# Patient Record
Sex: FEMALE | Race: WHITE | URBAN - METROPOLITAN AREA
[De-identification: names, ages, dates, MRNs, and addresses within clinical notes are randomized per-mention and may not be internally consistent; named-entity substitution may affect disease eponyms.]

---

## 2017-03-06 ENCOUNTER — APPOINTMENT (RX ONLY)
Dept: URBAN - METROPOLITAN AREA CLINIC 39 | Facility: CLINIC | Age: 47
Setting detail: DERMATOLOGY
End: 2017-03-06

## 2017-03-06 DIAGNOSIS — L70.0 ACNE VULGARIS: ICD-10-CM | Status: IMPROVED

## 2017-03-06 DIAGNOSIS — Z79.899 OTHER LONG TERM (CURRENT) DRUG THERAPY: ICD-10-CM

## 2017-03-06 PROCEDURE — ? PRESCRIPTION

## 2017-03-06 PROCEDURE — ? COUNSELING

## 2017-03-06 PROCEDURE — 99213 OFFICE O/P EST LOW 20 MIN: CPT

## 2017-03-06 PROCEDURE — ? HIGH RISK MEDICATION MONITORING

## 2017-03-06 PROCEDURE — ? TREATMENT REGIMEN

## 2017-03-06 RX ORDER — SPIRONOLACTONE 50 MG/1
TABLET, FILM COATED ORAL
Qty: 180 | Refills: 0 | Status: ERX

## 2017-03-06 NOTE — PROCEDURE: TREATMENT REGIMEN
Plan: Patient will call for refills of Aczone.\\n[facility] order given for potassium level to be checked in 1 month after increasing dose.\\nAcne overall improved, but pt. still reports notable flares, especially around menstrual period, so will increase dose of spironolactone
Continue Regimen: Aczone gel BID
Detail Level: Zone
Modify Regimen: Increase spironolactone to 100 mg PO daily\\n

## 2017-07-07 ENCOUNTER — APPOINTMENT (RX ONLY)
Dept: URBAN - METROPOLITAN AREA CLINIC 39 | Facility: CLINIC | Age: 47
Setting detail: DERMATOLOGY
End: 2017-07-07

## 2017-07-07 DIAGNOSIS — L73.8 OTHER SPECIFIED FOLLICULAR DISORDERS: ICD-10-CM

## 2017-07-07 DIAGNOSIS — D18.0 HEMANGIOMA: ICD-10-CM

## 2017-07-07 DIAGNOSIS — Z79.899 OTHER LONG TERM (CURRENT) DRUG THERAPY: ICD-10-CM

## 2017-07-07 DIAGNOSIS — T07XXXA INSECT BITE, NONVENOMOUS, OF OTHER, MULTIPLE, AND UNSPECIFIED SITES, WITHOUT MENTION OF INFECTION: ICD-10-CM

## 2017-07-07 DIAGNOSIS — L70.8 OTHER ACNE: ICD-10-CM

## 2017-07-07 PROBLEM — D18.01 HEMANGIOMA OF SKIN AND SUBCUTANEOUS TISSUE: Status: ACTIVE | Noted: 2017-07-07

## 2017-07-07 PROBLEM — L91.8 OTHER HYPERTROPHIC DISORDERS OF THE SKIN: Status: ACTIVE | Noted: 2017-07-07

## 2017-07-07 PROBLEM — S00.06XA INSECT BITE (NONVENOMOUS) OF SCALP, INITIAL ENCOUNTER: Status: ACTIVE | Noted: 2017-07-07

## 2017-07-07 PROCEDURE — ? TREATMENT REGIMEN

## 2017-07-07 PROCEDURE — ? HIGH RISK MEDICATION MONITORING

## 2017-07-07 PROCEDURE — ? PRESCRIPTION

## 2017-07-07 PROCEDURE — 99213 OFFICE O/P EST LOW 20 MIN: CPT

## 2017-07-07 PROCEDURE — ? COUNSELING

## 2017-07-07 RX ORDER — SPIRONOLACTONE 50 MG/1
TABLET, FILM COATED ORAL DAILY
Qty: 30 | Refills: 2 | Status: ERX

## 2017-07-07 ASSESSMENT — LOCATION ZONE DERM
LOCATION ZONE: TRUNK
LOCATION ZONE: LIP
LOCATION ZONE: FACE
LOCATION ZONE: SCALP

## 2017-07-07 ASSESSMENT — LOCATION SIMPLE DESCRIPTION DERM
LOCATION SIMPLE: RIGHT LIP
LOCATION SIMPLE: LEFT CHEEK
LOCATION SIMPLE: SCALP
LOCATION SIMPLE: ABDOMEN

## 2017-07-07 ASSESSMENT — LOCATION DETAILED DESCRIPTION DERM
LOCATION DETAILED: LEFT INFERIOR CENTRAL MALAR CHEEK
LOCATION DETAILED: LEFT INFERIOR POSTAURICULAR SKIN
LOCATION DETAILED: PERIUMBILICAL SKIN
LOCATION DETAILED: RIGHT UPPER CUTANEOUS LIP

## 2017-07-07 NOTE — PROCEDURE: TREATMENT REGIMEN
Continue Regimen: Aczone 7.5% gel daily
Plan: Lab slip sent with patient: Potassium (to be drawn 1 month after re-starting medication)
Modify Regimen: Re-start spironolactone 50mg PO daily (unable to tolerate 100 mg dose due to irregular menses and muscle cramping, denies any significant issues when previously on 50 mg daily)
Detail Level: Zone

## 2017-07-07 NOTE — HPI: SECONDARY COMPLAINT
How Severe Is This Condition?: mild
Additional History: Pt. concerned these may be from the spironolactone as well, as she hadn't ever noticed them until after starting the spironolactone.

## 2017-07-25 ENCOUNTER — APPOINTMENT (RX ONLY)
Dept: URBAN - METROPOLITAN AREA CLINIC 60 | Facility: CLINIC | Age: 47
Setting detail: DERMATOLOGY
End: 2017-07-25

## 2017-07-25 DIAGNOSIS — Z41.9 ENCOUNTER FOR PROCEDURE FOR PURPOSES OTHER THAN REMEDYING HEALTH STATE, UNSPECIFIED: ICD-10-CM

## 2017-07-25 PROCEDURE — ? LIGHTSHEER

## 2017-07-25 ASSESSMENT — LOCATION DETAILED DESCRIPTION DERM: LOCATION DETAILED: PHILTRUM

## 2017-07-25 ASSESSMENT — LOCATION ZONE DERM: LOCATION ZONE: LIP

## 2017-07-25 ASSESSMENT — LOCATION SIMPLE DESCRIPTION DERM: LOCATION SIMPLE: UPPER LIP

## 2017-07-25 NOTE — PROCEDURE: LIGHTSHEER
External Cooling Fan Speed: 0
Endpoint: Immediate endpoint: perifollicular erythema and edema. Post care reviewed with patient.
Total Pulses: 117
Spot Size: 9 mm
Hair Color (Optional): Brown
Hair Texture (Optional): coarse
Detail Level: Detailed
Laser Type: Laine WISE
Pulse Duration: auto
Cooling: chill tip
Post-Care Instructions: I reviewed with the patient in detail post-care instructions. Patient should avoid sun for a minimum of 4 weeks before and after treatment.
Skin Type (Optional): II
Consent: Written consent obtained, risks reviewed including but not limited to crusting, scabbing, blistering, scarring, darker or lighter pigmentary change, paradoxical hair regrowth, incomplete removal of hair and infection.
Fluence: 30

## 2017-08-23 ENCOUNTER — APPOINTMENT (RX ONLY)
Dept: URBAN - METROPOLITAN AREA CLINIC 60 | Facility: CLINIC | Age: 47
Setting detail: DERMATOLOGY
End: 2017-08-23

## 2017-08-23 DIAGNOSIS — Z41.9 ENCOUNTER FOR PROCEDURE FOR PURPOSES OTHER THAN REMEDYING HEALTH STATE, UNSPECIFIED: ICD-10-CM

## 2017-08-23 PROCEDURE — ? LIGHTSHEER

## 2017-08-23 NOTE — PROCEDURE: LIGHTSHEER
Pulse Duration: auto
Hair Color (Optional): Brown
Post-Care Instructions: I reviewed with the patient in detail post-care instructions. Patient should avoid sun for a minimum of 4 weeks before and after treatment.
Consent: Written consent obtained, risks reviewed including but not limited to crusting, scabbing, blistering, scarring, darker or lighter pigmentary change, paradoxical hair regrowth, incomplete removal of hair and infection.
Spot Size: 9 mm
Laser Type: Laine WISE
Total Pulses: 117
Detail Level: Detailed
Cooling: chill tip
Hair Texture (Optional): fine
Endpoint: Immediate endpoint: perifollicular erythema and edema. Post care reviewed with patient.
Fluence: 37
Skin Type (Optional): II
External Cooling Fan Speed: 0